# Patient Record
Sex: FEMALE | Race: WHITE | NOT HISPANIC OR LATINO | URBAN - METROPOLITAN AREA
[De-identification: names, ages, dates, MRNs, and addresses within clinical notes are randomized per-mention and may not be internally consistent; named-entity substitution may affect disease eponyms.]

---

## 2017-06-06 ENCOUNTER — EMERGENCY (EMERGENCY)
Facility: HOSPITAL | Age: 77
LOS: 1 days | Discharge: HOME | End: 2017-06-06
Attending: EMERGENCY MEDICINE | Admitting: EMERGENCY MEDICINE
Payer: MEDICARE

## 2017-06-06 VITALS
OXYGEN SATURATION: 97 % | RESPIRATION RATE: 16 BRPM | DIASTOLIC BLOOD PRESSURE: 78 MMHG | SYSTOLIC BLOOD PRESSURE: 137 MMHG | TEMPERATURE: 98 F | HEART RATE: 72 BPM

## 2017-06-06 DIAGNOSIS — Z79.899 OTHER LONG TERM (CURRENT) DRUG THERAPY: ICD-10-CM
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
## 2017-06-06 DIAGNOSIS — S70.02XA CONTUSION OF LEFT HIP, INITIAL ENCOUNTER: ICD-10-CM

## 2017-06-06 DIAGNOSIS — M25.522 PAIN IN LEFT ELBOW: ICD-10-CM
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
## 2017-06-06 DIAGNOSIS — S50.02XA CONTUSION OF LEFT ELBOW, INITIAL ENCOUNTER: ICD-10-CM
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
## 2017-06-06 PROCEDURE — 73502 X-RAY EXAM HIP UNI 2-3 VIEWS: CPT

## 2017-06-06 PROCEDURE — 99284 EMERGENCY DEPT VISIT MOD MDM: CPT | Mod: 25

## 2017-06-06 PROCEDURE — 73502 X-RAY EXAM HIP UNI 2-3 VIEWS: CPT | Mod: 26,LT

## 2017-06-06 PROCEDURE — 73080 X-RAY EXAM OF ELBOW: CPT | Mod: 26,LT

## 2017-06-06 PROCEDURE — 73080 X-RAY EXAM OF ELBOW: CPT

## 2017-06-06 RX ORDER — GABAPENTIN 400 MG/1
0 CAPSULE ORAL
Qty: 0 | Refills: 0 | COMMUNITY

## 2017-06-06 RX ORDER — IBUPROFEN 200 MG
400 TABLET ORAL ONCE
Qty: 0 | Refills: 0 | Status: COMPLETED | OUTPATIENT
Start: 2017-06-06 | End: 2017-06-06

## 2017-06-06 RX ADMIN — Medication 400 MILLIGRAM(S): at 18:19

## 2017-06-06 NOTE — ED PROVIDER NOTE - MUSCULOSKELETAL, MLM
L UE: no shoulder tend, no humerus tend, + contusion to L elbow - some swelling, FROM, no pain w/supination or pronation, + light touch, no wrist or elbow tend. LE + symmetry, no shortening or rotation b/l, + point tend over L superior ramus, FROM and steady gait, no knee or ankle tend b/l

## 2017-06-06 NOTE — ED PROVIDER NOTE - MEDICAL DECISION MAKING DETAILS
pt w/elbow and hip pain s/p mechanical fall - FROM of all extremities and normal gait, xrays done to reassure, pain resolved w/ibuprofen, to ice and rest, elevate and f/u w/ortho

## 2017-06-06 NOTE — ED ADULT NURSE NOTE - OBJECTIVE STATEMENT
trip and fall hurting left side - left hip and left elbow; BRISCOE, states chronic back pain at L4-5 from disc problems radiating mor e to left; elbow is swollen with an abrasion ; denies LOC or other symptoms

## 2017-06-06 NOTE — ED PROVIDER NOTE - OBJECTIVE STATEMENT
The pt is a 75 y/o F, who presents to ED s/p trip and fall - c/o pain to L hip and L elbow. Pain is 6/10, constant and achy, aggravated w/ROM, has not taken any pain meds. Able to weight bare. Denies head trauma, loc, decreased ROM, numbness or tingling to fingers or toes, any other injuries

## 2017-06-06 NOTE — ED ADULT TRIAGE NOTE - CHIEF COMPLAINT QUOTE
Pt tripped and fell, presenting with pain to L hip and L elbow.  Pt has been ambulatory since fall, no shortening and rotation noted.

## 2019-01-15 ENCOUNTER — TELEPHONE (OUTPATIENT)
Dept: NEUROSURGERY | Facility: CLINIC | Age: 79
End: 2019-01-15

## 2019-01-16 DIAGNOSIS — M43.16 SPONDYLOLISTHESIS AT L4-L5 LEVEL: Primary | ICD-10-CM

## 2019-01-16 NOTE — TELEPHONE ENCOUNTER
I've entered a response to the patient's message below.    Response to the message:       Lumbar Xrays (complete w/ flexion-extension).    DEXA scan from 2017 shows osteoporosis.  Prior to surgery, we may need to get her started on Forteo or Prolia.  Such softening of the bones places her at high risk for hardware loosening, etc.    Could we arrange an endocrine/rheumatology appt in meantime?  Can still schedule her with me but we can at least get started on that.

## 2019-01-18 NOTE — TELEPHONE ENCOUNTER
Spoke to pt. Names of endocrine/rheumatologist given to pt. Pt would like to discuss with her pcp Prior to making a decision on moving forward. Pt will call me once she has made a decision on who she will be seeing. I advised pt that I would schedule her appt with Dr. Gaytan  a couple days after her appt with eamon/endocrine.

## 2022-11-08 NOTE — ED ADULT NURSE NOTE - NS ED NURSE LEVEL OF CONSCIOUSNESS SPEECH
If you are a smoker, it is important for your health to stop smoking. Please be aware that second hand smoke is also harmful. Speaking Coherently